# Patient Record
Sex: MALE | Race: BLACK OR AFRICAN AMERICAN | Employment: FULL TIME | URBAN - NONMETROPOLITAN AREA
[De-identification: names, ages, dates, MRNs, and addresses within clinical notes are randomized per-mention and may not be internally consistent; named-entity substitution may affect disease eponyms.]

---

## 2018-10-11 ENCOUNTER — OFFICE VISIT (OUTPATIENT)
Dept: PRIMARY CARE CLINIC | Age: 33
End: 2018-10-11
Payer: OTHER GOVERNMENT

## 2018-10-11 VITALS
WEIGHT: 281 LBS | HEART RATE: 64 BPM | TEMPERATURE: 98 F | SYSTOLIC BLOOD PRESSURE: 126 MMHG | OXYGEN SATURATION: 98 % | DIASTOLIC BLOOD PRESSURE: 78 MMHG

## 2018-10-11 DIAGNOSIS — M25.572 CHRONIC PAIN OF BOTH ANKLES: ICD-10-CM

## 2018-10-11 DIAGNOSIS — M25.571 CHRONIC PAIN OF BOTH ANKLES: ICD-10-CM

## 2018-10-11 DIAGNOSIS — G89.29 CHRONIC PAIN OF BOTH ANKLES: ICD-10-CM

## 2018-10-11 PROCEDURE — 99202 OFFICE O/P NEW SF 15 MIN: CPT | Performed by: FAMILY MEDICINE

## 2018-10-11 NOTE — PROGRESS NOTES
[facility]             [address] [facility], [address] [address], [address]                        Telephone [phone]             Fax [phone]       Arabella Blood  1985  MRN:  [id number]  Date of visit:  10/11/18    Subjective:    Arabella Blood is a 28 y.o.  male who presents to [address] [place] [address] Urgent Care today (10/11/18) for evaluation of: Other (bilat achilles tendonitis)      He states that he has had problems with bilateral ankle pain for the past 8 years. He is in the [employer]. He was seen by a doctor in [facility] in July. He was prescribed a walking boot and Voltaren. He was also given a note for the  that states that he should not run. He now needs an updated letter to avoid running. He also requests referral to orthopedics. He admits that he did not take the Voltaren or use the walking boot. He does feel that avoiding running has been helpful. He has the following problem list:  Patient Active Problem List   Diagnosis    Chronic pain of both ankles        He does not take any prescription medications currently. He has No Known Allergies. He  reports that he has never smoked. He has never used smokeless tobacco.      Objective:    Vitals:    10/11/18 1832   BP: 126/78   Site: Left Upper Arm   Position: Sitting   Cuff Size: Large Adult   Pulse: 64   Temp: 98 °F (36.7 °C)   TempSrc: Tympanic   SpO2: 98%   Weight: 281 lb (127.5 kg)     There is no height or weight on file to calculate BMI. Well-nourished, well-developed [facility] [employer] male, in no acute distress. The ankles have normal range of motion bilaterally. There is moderate tenderness to palpation of the right ankle posteriorly at the insertion of the Achilles tendon. There is no tenderness to palpation of the left ankle. There is no significant swelling or deformity of either ankle.   His gait is within normal limits. Assessment and Plan:    Chronic pain of both ankles  He is not interested in taking medications. I have referred him to orthopedic surgery:  - Ambulatory referral to Orthopedic Surgery    I completed paper work to continue his running restriction for 30 more days.       (Please note that portions of this note were completed with a voice-recognition program. Efforts were made to edit the dictation but occasionally words are mis-transcribed.)

## 2018-10-16 DIAGNOSIS — M25.572 CHRONIC PAIN OF BOTH ANKLES: Primary | ICD-10-CM

## 2018-10-16 DIAGNOSIS — M25.571 CHRONIC PAIN OF BOTH ANKLES: Primary | ICD-10-CM

## 2018-10-16 DIAGNOSIS — G89.29 CHRONIC PAIN OF BOTH ANKLES: Primary | ICD-10-CM

## 2018-10-17 ENCOUNTER — HOSPITAL ENCOUNTER (OUTPATIENT)
Dept: GENERAL RADIOLOGY | Age: 33
Discharge: HOME | End: 2018-10-19
Payer: OTHER GOVERNMENT

## 2018-10-17 ENCOUNTER — OFFICE VISIT (OUTPATIENT)
Dept: ORTHOPEDIC SURGERY | Age: 33
End: 2018-10-17
Payer: OTHER GOVERNMENT

## 2018-10-17 VITALS
WEIGHT: 281 LBS | HEIGHT: 72 IN | BODY MASS INDEX: 38.06 KG/M2 | SYSTOLIC BLOOD PRESSURE: 131 MMHG | DIASTOLIC BLOOD PRESSURE: 74 MMHG

## 2018-10-17 DIAGNOSIS — G89.29 CHRONIC PAIN OF BOTH ANKLES: ICD-10-CM

## 2018-10-17 DIAGNOSIS — R26.89 FUNCTIONAL GAIT ABNORMALITY: ICD-10-CM

## 2018-10-17 DIAGNOSIS — M25.572 CHRONIC PAIN OF BOTH ANKLES: ICD-10-CM

## 2018-10-17 DIAGNOSIS — M76.62 ACHILLES TENDONITIS, BILATERAL: Primary | ICD-10-CM

## 2018-10-17 DIAGNOSIS — M76.61 ACHILLES TENDONITIS, BILATERAL: Primary | ICD-10-CM

## 2018-10-17 DIAGNOSIS — M25.571 CHRONIC PAIN OF BOTH ANKLES: ICD-10-CM

## 2018-10-17 PROCEDURE — 73610 X-RAY EXAM OF ANKLE: CPT

## 2018-10-17 PROCEDURE — 99203 OFFICE O/P NEW LOW 30 MIN: CPT | Performed by: FAMILY MEDICINE

## 2018-10-23 ENCOUNTER — HOSPITAL ENCOUNTER (OUTPATIENT)
Dept: PHYSICAL THERAPY | Facility: CLINIC | Age: 33
Setting detail: THERAPIES SERIES
Discharge: HOME | End: 2018-10-23
Payer: OTHER GOVERNMENT

## 2018-10-23 PROCEDURE — 97750 PHYSICAL PERFORMANCE TEST: CPT

## 2018-10-23 PROCEDURE — 97161 PT EVAL LOW COMPLEX 20 MIN: CPT

## 2018-10-23 PROCEDURE — 97110 THERAPEUTIC EXERCISES: CPT

## 2018-10-23 NOTE — CONSULTS
FUNCTIONAL TESTS PAIN NO PAIN COMMENTS   Step Test  [] [] 4/6/8\"   2 legged squat [] []    1 legged squat [] []    10x 1 legged squat  [] []      Comments:  Assessment: client presents with increased pain in michael AT due to increased calf stiffness and sagittal plane running faults. Problems:    [x] ? Pain:     [x] ? ROM:    [x] ? Strength:    [x] ? Function: Not able to run as he wishes   [] ? Balance  [] Increased edema:  [] Postural Deviations  [x] Gait Deviations  [] Other:      STG: (to be met in 5 treatments)  1. ? Pain: <3/10 so that he can continue with his return to running program  2. ? ROM:improved DF at midstance on both legs  3. ? Strength: able to perform eccentric calf raises 3x15 without increased pain  4. ? Function: able to run/walk 2/3x 6cycles with a shruti of at least 168 spm  5. Independent with Home Exercise Programs    LTG: (to be met in 10 treatments)  1. Able to run 2 miles at 8 min/mile pace  2. No pain in michael AT                 Patient goals:able to return to running without pain    Rehab Potential:  [x] Good  [] Fair  [] Poor   Suggested Professional Referral:  [x] No  [] Yes:  Barriers to Goal Achievement[de-identified]  [x] No  [] Yes:  Domestic Concerns:  [x] No  [] Yes:    Pt. Education:  [x] Plans/Goals, Risks/Benefits discussed  [x] Home exercise program    Method of Education: [] Verbal  [] Demo  [x] Written  Comprehension of Education:  [] Verbalizes understanding. [x] Demonstrates understanding. [] Needs Review. [] Demonstrates/verbalizes understanding of HEP/Ed previously given.     Treatment Plan:  [x] Therapeutic Exercise    [] Therapeutic Activity  [x] Manual Therapy   [] Alter G treadmill  [x] Phys perf test     [x] Vasocompression/Game Ready   [x] Neuromuscular Re-education [] Massage     [x] Instruction in HEP     [] Aquatic Therapy                           Frequency:  1x/week for 10 visits    Todays Treatment:  Modalities: none  Precautions: standard  Exercises:  Exercise

## 2018-10-30 ENCOUNTER — HOSPITAL ENCOUNTER (OUTPATIENT)
Dept: PHYSICAL THERAPY | Facility: CLINIC | Age: 33
Setting detail: THERAPIES SERIES
Discharge: HOME | End: 2018-10-30
Payer: OTHER GOVERNMENT

## 2018-10-30 PROCEDURE — 97016 VASOPNEUMATIC DEVICE THERAPY: CPT

## 2018-10-30 PROCEDURE — 97110 THERAPEUTIC EXERCISES: CPT

## 2018-11-06 ENCOUNTER — HOSPITAL ENCOUNTER (OUTPATIENT)
Dept: PHYSICAL THERAPY | Facility: CLINIC | Age: 33
Setting detail: THERAPIES SERIES
Discharge: HOME | End: 2018-11-06
Payer: OTHER GOVERNMENT

## 2018-11-06 PROCEDURE — 97016 VASOPNEUMATIC DEVICE THERAPY: CPT

## 2018-11-06 PROCEDURE — 97110 THERAPEUTIC EXERCISES: CPT

## 2018-11-13 ENCOUNTER — HOSPITAL ENCOUNTER (OUTPATIENT)
Dept: PHYSICAL THERAPY | Facility: CLINIC | Age: 33
Setting detail: THERAPIES SERIES
Discharge: HOME | End: 2018-11-13
Payer: OTHER GOVERNMENT

## 2018-11-13 PROCEDURE — 97110 THERAPEUTIC EXERCISES: CPT

## 2018-11-13 NOTE — FLOWSHEET NOTE
[]  Vasocompression     []  Other     Total Treatment time 60 4       Assessment: [x] Progressing toward goals. At this point, his running ability is limited by cardiovascular endurance and not by AT.     [] No change. [] Other:    Goals:   STG: (to be met in 5 treatments)  1. ? Pain: <3/10 so that he can continue with his return to running program MET  2. ? ROM:improved DF at midstance on both legs MET  3. ? Strength: able to perform eccentric calf raises 3x15 without increased pain MET  4. ? Function: able to run/walk 2/3x 4 cycles with a shruti of at least 168 spm MET  5. Independent with Home Exercise Programs     LTG: (to be met in 10 treatments)  1. Able to run 2 miles at 8 min/mile pace  2. No pain in michael AT                 Patient goals:able to return to running without pain    Pt. Education:  [x] Yes  [] No  [x] Reviewed Prior HEP/Ed  Method of Education: [x] Verbal  [x] Demo  [] Written   Comprehension of Education:  [] Verbalizes understanding. [] Demonstrates understanding. [] Needs review. [x] Demonstrates/verbalizes HEP/Ed previously given. Plan: [x] Continue per plan of care.  1x/wk    [] Other:      Time In:0900            Time Out: 1000    Electronically signed by:  Juanda Phalen, PT

## 2018-11-20 ENCOUNTER — HOSPITAL ENCOUNTER (OUTPATIENT)
Dept: PHYSICAL THERAPY | Facility: CLINIC | Age: 33
Setting detail: THERAPIES SERIES
Discharge: HOME | End: 2018-11-20
Payer: OTHER GOVERNMENT

## 2018-11-20 PROCEDURE — 97110 THERAPEUTIC EXERCISES: CPT

## 2018-11-27 ENCOUNTER — APPOINTMENT (OUTPATIENT)
Dept: PHYSICAL THERAPY | Facility: CLINIC | Age: 33
End: 2018-11-27
Payer: OTHER GOVERNMENT

## 2018-12-04 ENCOUNTER — HOSPITAL ENCOUNTER (OUTPATIENT)
Dept: PHYSICAL THERAPY | Facility: CLINIC | Age: 33
Setting detail: THERAPIES SERIES
Discharge: HOME | End: 2018-12-04
Payer: OTHER GOVERNMENT

## 2019-08-02 ENCOUNTER — HOSPITAL ENCOUNTER (OUTPATIENT)
Age: 34
Discharge: HOME | End: 2019-08-02